# Patient Record
Sex: FEMALE | Race: BLACK OR AFRICAN AMERICAN | Employment: FULL TIME | URBAN - METROPOLITAN AREA
[De-identification: names, ages, dates, MRNs, and addresses within clinical notes are randomized per-mention and may not be internally consistent; named-entity substitution may affect disease eponyms.]

---

## 2018-06-09 ENCOUNTER — HOSPITAL ENCOUNTER (EMERGENCY)
Age: 23
Discharge: HOME | End: 2018-06-09
Attending: EMERGENCY MEDICINE
Payer: COMMERCIAL

## 2018-06-09 VITALS
HEIGHT: 62 IN | HEART RATE: 84 BPM | TEMPERATURE: 98 F | DIASTOLIC BLOOD PRESSURE: 76 MMHG | BODY MASS INDEX: 34.78 KG/M2 | WEIGHT: 189 LBS | OXYGEN SATURATION: 98 % | SYSTOLIC BLOOD PRESSURE: 135 MMHG | RESPIRATION RATE: 17 BRPM

## 2018-06-09 DIAGNOSIS — T07.XXXA: Primary | ICD-10-CM

## 2018-06-09 DIAGNOSIS — Z23 IMMUNIZATION, TETANUS-DIPHTHERIA: ICD-10-CM
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
## 2018-06-09 LAB — HCG UR QL: NEGATIVE

## 2018-06-09 PROCEDURE — 90471 IMMUNIZATION ADMIN: CPT

## 2018-06-09 PROCEDURE — 75810000293 HC SIMP/SUPERF WND  RPR

## 2018-06-09 PROCEDURE — 81025 URINE PREGNANCY TEST: CPT

## 2018-06-09 PROCEDURE — 99283 EMERGENCY DEPT VISIT LOW MDM: CPT

## 2018-06-09 PROCEDURE — 77030031139 HC SUT VCRL2 J&J -A

## 2018-06-09 PROCEDURE — 74011250636 HC RX REV CODE- 250/636: Performed by: PHYSICIAN ASSISTANT

## 2018-06-09 PROCEDURE — 75810000294 HC INTERM/LAYERED WND RPR

## 2018-06-09 PROCEDURE — 77030002986 HC SUT PROL J&J -A

## 2018-06-09 PROCEDURE — 74011000250 HC RX REV CODE- 250: Performed by: PHYSICIAN ASSISTANT

## 2018-06-09 PROCEDURE — 90715 TDAP VACCINE 7 YRS/> IM: CPT | Performed by: PHYSICIAN ASSISTANT

## 2018-06-09 PROCEDURE — 77030031132 HC SUT NYL COVD -A

## 2018-06-09 PROCEDURE — 77030018836 HC SOL IRR NACL ICUM -A

## 2018-06-09 PROCEDURE — 74011000250 HC RX REV CODE- 250: Performed by: EMERGENCY MEDICINE

## 2018-06-09 RX ORDER — HYDROCODONE BITARTRATE AND ACETAMINOPHEN 5; 325 MG/1; MG/1
1 TABLET ORAL
Qty: 6 TAB | Refills: 0 | Status: SHIPPED | OUTPATIENT
Start: 2018-06-09

## 2018-06-09 RX ORDER — LIDOCAINE HYDROCHLORIDE 10 MG/ML
20 INJECTION, SOLUTION EPIDURAL; INFILTRATION; INTRACAUDAL; PERINEURAL ONCE
Status: COMPLETED | OUTPATIENT
Start: 2018-06-09 | End: 2018-06-09

## 2018-06-09 RX ORDER — DOXYCYCLINE 100 MG/1
100 CAPSULE ORAL 2 TIMES DAILY
Qty: 14 CAP | Refills: 0 | Status: SHIPPED | OUTPATIENT
Start: 2018-06-09 | End: 2018-06-16

## 2018-06-09 RX ORDER — BACITRACIN 500 UNIT/G
1 PACKET (EA) TOPICAL
Status: COMPLETED | OUTPATIENT
Start: 2018-06-09 | End: 2018-06-09

## 2018-06-09 RX ORDER — BACITRACIN 500 [USP'U]/G
OINTMENT TOPICAL
Status: DISCONTINUED | OUTPATIENT
Start: 2018-06-09 | End: 2018-06-09 | Stop reason: CLARIF

## 2018-06-09 RX ADMIN — BACITRACIN 1 PACKET: 500 OINTMENT TOPICAL at 20:41

## 2018-06-09 RX ADMIN — LIDOCAINE HYDROCHLORIDE 20 ML: 10 INJECTION, SOLUTION EPIDURAL; INFILTRATION; INTRACAUDAL; PERINEURAL at 20:22

## 2018-06-09 RX ADMIN — TETANUS TOXOID, REDUCED DIPHTHERIA TOXOID AND ACELLULAR PERTUSSIS VACCINE, ADSORBED 0.5 ML: 5; 2.5; 8; 8; 2.5 SUSPENSION INTRAMUSCULAR at 17:48

## 2018-06-09 NOTE — LETTER
[facility] EMERGENCY DEPT 
[facility] [address] [id number] 
[phone] Work/School Note Date: 6/9/2018 To Whom It May concern: 
 
Nolvia Hyltno was seen and treated today in the emergency room by the following provider(s): 
Attending Provider: Nicole Pereira MD 
Physician Assistant: Araseli Davis PA-C. Nolvia Hylton may return to work on 6/12/18. Sincerely, Araseli Davis PA-C

## 2018-06-09 NOTE — ED PROVIDER NOTES
EMERGENCY DEPARTMENT HISTORY AND PHYSICAL EXAM    Date: 6/9/2018  Patient Name: Jose Manuel Hines    History of Presenting Illness     Chief Complaint   Patient presents with    Laceration         History Provided By: Patient    Chief Complaint: Lacerations  Duration: Just prior to arrival  Timing:  Acute  Location: Bilateral lower legs  Quality: Burning  Severity: 7 out of 10  Modifying Factors: None identified. Associated Symptoms: denies any other associated signs or symptoms    Additional History (Context):   5:46 PM  Jose Manuel Hines is a 25 y.o. female with no significant PMHX who presents to the emergency department C/O lacerations to right lower leg after she slipped and fell on the rocks at [facility] just prior to arrival. Denies head strike, LOC, vomiting, any other injuries sustained, and any other sxs or complaints. She admits to ETOH today. LMP 5/29/18. Unsure of last tetanus shot, NKDA. PCP: None    Current Outpatient Prescriptions   Medication Sig Dispense Refill    doxycycline (MONODOX) 100 mg capsule Take 1 Cap by mouth two (2) times a day for 7 days. 14 Cap 0    HYDROcodone-acetaminophen (NORCO) 5-325 mg per tablet Take 1 Tab by mouth every four (4) hours as needed for Pain. Max Daily Amount: 6 Tabs. 6 Tab 0       Past History     Past Medical History:  History reviewed. No pertinent past medical history. Past Surgical History:  History reviewed. No pertinent surgical history. Family History:  History reviewed. No pertinent family history. Social History:  Social History   Substance Use Topics    Smoking status: Current Every Day Smoker    Smokeless tobacco: Never Used    Alcohol use Yes       Allergies:  No Known Allergies      Review of Systems   Review of Systems   Constitutional: Negative for fever. Skin: Positive for wound. Neurological: Negative for syncope, weakness and numbness. All other systems reviewed and are negative.       Physical Exam     Vitals:    06/09/18 1707 06/09/18 2052   BP: 135/76    Pulse: (!) 118 84   Resp: 17    Temp: 98 °F (36.7 °C)    SpO2: 98%    Weight: 85.7 kg (189 lb)    Height: 5' 2\" (1.575 m)      Physical Exam   Constitutional: She appears well-developed and well-nourished. No distress. HENT:   Head: Normocephalic and atraumatic. Right Ear: External ear normal.   Left Ear: External ear normal.   Nose: Nose normal.   Eyes: Conjunctivae are normal.   Neck: Normal range of motion. Cardiovascular: Normal rate, regular rhythm and normal heart sounds. Pulmonary/Chest: Effort normal and breath sounds normal. No respiratory distress. Neurological: She is alert. Skin: Skin is warm and dry. She is not diaphoretic. RLE:   Multiple abrasions over the anteromedial lower extremity. Abrasions are superficial but some are gaping, specifically on the medial aspect of the extremity. Lateral aspect of the foot proximal to the fifth digit there is a 2 cm gaping laceration without evidence of underlying structures. On the plantar aspect of the foot there is a 5mm laceration at the base of the fifth digit. Good ROM of the extremity, sensation is intact, neurovascularly intact, including at the fifth digit with cap refill < 2 seconds. Psychiatric: She has a normal mood and affect. Vitals reviewed.         Diagnostic Study Results     Labs -     Recent Results (from the past 12 hour(s))   HCG URINE, QL. - POC    Collection Time: 06/09/18  5:53 PM   Result Value Ref Range    Pregnancy test,urine (POC) NEGATIVE  NEG         Radiologic Studies -   No orders to display     CT Results  (Last 48 hours)    None        CXR Results  (Last 48 hours)    None          Medications given in the ED-  Medications   diph,Pertuss(AC),Tet Vac-PF (BOOSTRIX) suspension 0.5 mL (0.5 mL IntraMUSCular Given 6/9/18 1748)   lidocaine (PF) (XYLOCAINE) 10 mg/mL (1 %) injection 20 mL (20 mL IntraDERMal Given 6/9/18 2022)   bacitracin 500 unit/gram packet 1 Packet (1 Packet Topical Given 6/9/18 2041)         Medical Decision Making   I am the first provider for this patient. I reviewed the vital signs, available nursing notes, past medical history, past surgical history, family history and social history. Vital Signs-Reviewed the patient's vital signs. Records Reviewed: Nursing Notes    Provider Notes (Medical Decision Making): Patient with multiple lacerations and abrasions to right lower leg after slip and fall today. Repaired in the ED, updated tetanus, will place on doxycycline for infection prophylaxis. Procedures:  Wound Repair  Date/Time: 6/10/2018 2:08 AM  Performed by: PAPreparation: skin prepped with Betadine  Location details: right leg  right foot and right little toe  Anesthesia: local infiltration    Anesthesia:  Local Anesthetic: lidocaine 1% without epinephrine  Foreign bodies: no foreign bodies  Irrigation solution: saline  Irrigation method: syringe  Debridement: minimal  Skin closure: 4-0 nylon and 5-0 nylon  Subcutaneous closure: Vicryl  Number of sutures: 31  Technique: simple  Approximation: close  Dressing: antibiotic ointment and gauze roll  Patient tolerance: Patient tolerated the procedure well with no immediate complications  My total time at bedside, performing this procedure was 46-60 minutes. Comments: Laceration #1 on diagram received three subcutaneous sutures with 5-0 vicryl and six skin sutures with 4-0 nylon. Laceration #2 on diagram received three subcutaneous sutures with 5-0 vicryl and four skin sutures with 4-0 nylon. Four 5-0 prolene sutures to the anteromedial aspect of the extremity. Two 5-0 prolene sutures to the dorsum of the foot. Two 5-0 prolene sutures to the base of the fifth digit. Two 5-0 prolene sutures to the lateral aspect of the fifth digit. All wounds cleaned with normal saline and betadine.            Diagnosis and Disposition       DISCHARGE NOTE:    Cami Orosco's  results have been reviewed with her. She has been counseled regarding her diagnosis, treatment, and plan. She verbally conveys understanding and agreement of the signs, symptoms, diagnosis, treatment and prognosis and additionally agrees to follow up as discussed. She also agrees with the care-plan and conveys that all of her questions have been answered. I have also provided discharge instructions for her that include: educational information regarding their diagnosis and treatment, and list of reasons why they would want to return to the ED prior to their follow-up appointment, should her condition change. She has been provided with education for proper emergency department utilization. CLINICAL IMPRESSION:    1. Laceration of multiple sites    2. Immunization, tetanus-diphtheria    3. Fall, initial encounter        PLAN:  1. D/C Home  2. Discharge Medication List as of 6/9/2018  8:24 PM      START taking these medications    Details   doxycycline (MONODOX) 100 mg capsule Take 1 Cap by mouth two (2) times a day for 7 days. , Print, Disp-14 Cap, R-0      HYDROcodone-acetaminophen (NORCO) 5-325 mg per tablet Take 1 Tab by mouth every four (4) hours as needed for Pain. Max Daily Amount: 6 Tabs., Print, Disp-6 Tab, R-0           3.    Follow-up Information     Follow up With Details Comments [address] Schedule an appointment as soon as possible for a visit for primary care needs [address] [facility] EMERGENCY DEPT  As needed, If symptoms worsen [address]  [phone]

## 2018-06-09 NOTE — Clinical Note
You have 23 sutures that need to be removed in 10-14 days. 1. Antibiotic as prescribed and until finished. 2. Tylenol/Motrin for pain. Norco for severe pain. 3. Return for persistent or worsening symptoms, redness, fevers, drainage.

## 2018-06-20 ENCOUNTER — HOSPITAL ENCOUNTER (EMERGENCY)
Age: 23
Discharge: HOME | End: 2018-06-20
Attending: EMERGENCY MEDICINE
Payer: COMMERCIAL

## 2018-06-20 VITALS
SYSTOLIC BLOOD PRESSURE: 121 MMHG | TEMPERATURE: 98.6 F | RESPIRATION RATE: 16 BRPM | OXYGEN SATURATION: 100 % | DIASTOLIC BLOOD PRESSURE: 84 MMHG | HEART RATE: 88 BPM

## 2018-06-20 DIAGNOSIS — Z48.02 VISIT FOR SUTURE REMOVAL: Primary | ICD-10-CM

## 2018-06-20 PROCEDURE — 75810000275 HC EMERGENCY DEPT VISIT NO LEVEL OF CARE

## 2018-06-21 NOTE — ED PROVIDER NOTES
EMERGENCY DEPARTMENT HISTORY AND PHYSICAL EXAM    Date: 6/20/2018  Patient Name: Adan Callahan    History of Presenting Illness     Chief Complaint   Patient presents with    Suture Removal         History Provided By: Patient    Chief Complaint: suture removal   Duration: 11 days   Timing:  Constant  Location: Right lower leg   Quality: n/a  Severity: Mild  Modifying Factors: None   Associated Symptoms: none       Additional History (Context): Adan Callahan is a 25 y.o. female with no medical history who presents with a 11 day history for suture removal, denies any concerns of signs of infection or worsening condition. Pt denies any fevers or chills, headache, dizziness or light headedness, ENT issues, CP or discomfort, SOB, cough, n/v/d/c, abd pain, back pain, diaphoresis, melena/hematochezia, dysuria, hematuria, frequency, focal weakness/numbness/tingling. Patient has no other complaints at this time. PCP: None    Current Outpatient Prescriptions   Medication Sig Dispense Refill    HYDROcodone-acetaminophen (NORCO) 5-325 mg per tablet Take 1 Tab by mouth every four (4) hours as needed for Pain. Max Daily Amount: 6 Tabs. 6 Tab 0       Past History     Past Medical History:  History reviewed. No pertinent past medical history. Past Surgical History:  History reviewed. No pertinent surgical history. Family History:  History reviewed. No pertinent family history. Social History:  Social History   Substance Use Topics    Smoking status: Current Every Day Smoker    Smokeless tobacco: Never Used    Alcohol use Yes       Allergies:  No Known Allergies      Review of Systems   Review of Systems   Constitutional: Negative for chills and fever. HENT: Negative for congestion, rhinorrhea and sore throat. Respiratory: Negative for cough and shortness of breath. Cardiovascular: Negative for chest pain.    Gastrointestinal: Negative for abdominal pain, blood in stool, constipation, diarrhea, nausea and vomiting. Genitourinary: Negative for dysuria, frequency and hematuria. Musculoskeletal: Negative for back pain and myalgias. Skin: Positive for wound. Negative for rash. Neurological: Negative for dizziness and headaches. All other systems reviewed and are negative. All Other Systems Negative  Physical Exam     Vitals:    06/20/18 2002   BP: 121/84   Pulse: 88   Resp: 16   Temp: 98.6 °F (37 °C)   SpO2: 100%     Physical Exam   Constitutional: She is oriented to person, place, and time. She appears well-developed and well-nourished. No distress. HENT:   Head: Normocephalic and atraumatic. Eyes: Conjunctivae are normal.   Neck: Normal range of motion. Neck supple. Cardiovascular: Normal rate, regular rhythm and normal heart sounds. Pulmonary/Chest: Effort normal and breath sounds normal. No respiratory distress. She exhibits no tenderness. Abdominal: Soft. Bowel sounds are normal. She exhibits no distension. There is no tenderness. There is no rebound and no guarding. Musculoskeletal: She exhibits no edema or deformity. Neurological: She is alert and oriented to person, place, and time. Skin: Skin is warm and dry. She is not diaphoretic. Multiple well healing lacerations noted to the right lower leg, large abrasion without signs of infection noted to medial lower leg    Psychiatric: She has a normal mood and affect. Her behavior is normal.   Nursing note and vitals reviewed. Diagnostic Study Results     Labs -   No results found for this or any previous visit (from the past 12 hour(s)). Radiologic Studies -   No orders to display     CT Results  (Last 48 hours)    None        CXR Results  (Last 48 hours)    None            Medical Decision Making   I am the first provider for this patient. I reviewed the vital signs, available nursing notes, past medical history, past surgical history, family history and social history.     Vital Signs-Reviewed the patient's vital signs.      Pulse Oximetry Analysis -  100 % RA    Records Reviewed: Nursing Notes and Old Medical Records     Procedures: None   Suture/Staple Removal  Date/Time: 6/20/2018 8:29 PM  Performed by: Yana Mann  Authorized by: Alison JIMENEZ     Consent:     Consent obtained:  Verbal    Consent given by:  Patient    Risks discussed:  Bleeding and wound separation    Alternatives discussed:  No treatment and delayed treatment  Location:     Location:  Lower extremity    Lower extremity location:  Leg    Leg location:  R lower leg  Procedure details:     Wound appearance:  No signs of infection, good wound healing and clean    Number of sutures removed:  24  Post-procedure details:     Post-removal:  No dressing applied    Patient tolerance of procedure: Tolerated well, no immediate complications        Provider Notes (Medical Decision Making):     differential diagnosis: suture removal, abscess, cellulitis       Plan: Will remove sutures       MED RECONCILIATION:  No current facility-administered medications for this encounter. Current Outpatient Prescriptions   Medication Sig    HYDROcodone-acetaminophen (NORCO) 5-325 mg per tablet Take 1 Tab by mouth every four (4) hours as needed for Pain. Max Daily Amount: 6 Tabs. Disposition:  Home     DISCHARGE NOTE:   Pt has been reexamined. Patient has no new complaints, changes, or physical findings. Care plan outlined and precautions discussed. Results of workup were reviewed with the patient. All medications were reviewed with the patient. All of pt's questions and concerns were addressed. Patient was instructed and agrees to follow up with PCP as needed, as well as to return to the ED upon further deterioration. Patient is ready to go home. Follow-up Information     Follow up With Details Comments Contact [facility] EMERGENCY DEPT  As needed [address] [place] [address]  [phone]          Diagnosis     Clinical Impression:   1.  Visit for suture removal

## 2018-06-21 NOTE — DISCHARGE INSTRUCTIONS
Learning About Stitches and Staples Removal  When are stitches and staples removed? Your doctor will tell you when to have your stitches or staples removed, usually in 7 to 14 days. How long you'll be told to wait will depend on things like where the wound is located, how big and how deep the wound is, and what your general health is like. Do not remove the stitches on your own. Stitches on the face are usually removed within a week. But stitches and staples on other areas of the body, such as on the back or belly or over a joint, may need to stay in place longer, often a week or two. Be sure to follow your doctor's instructions. How are stitches and staples removed? It usually doesn't hurt when the doctor removes the stitches or staples. You may feel a tug as each stitch or staple is removed. · You will either be seated or lying down. · To remove stitches, the doctor will use scissors to cut each of the knots and then pull the threads out. · To remove staples, the doctor will use a tool to take out the staples one at a time. · The area may still feel tender after the stitches or staples are gone. But it should feel better within a few minutes or up to a few hours. What can you expect after stitches and staples are removed? Depending on the type and location of the cut, you will have a scar. Scars usually fade over time. Keep the area clean, but you won't need a bandage. When should you call for help? Call your doctor now or seek immediate medical care if :  · You have new pain, or your pain gets worse. · You have trouble moving the area near the scar. · You have symptoms of infection, such as:  ¨ Increased pain, swelling, warmth, or redness around the scar. ¨ Red streaks leading from the scar. ¨ Pus draining from the scar. ¨ A fever. Watch closely for changes in your health, and be sure to contact your doctor if:  · The scar opens. · You do not get better as expected.   Follow-up care is a key part of your treatment and safety. Be sure to make and go to all appointments, and call your doctor if you do not get better as expected. It's also a good idea to keep a list of the medicines you take. Where can you learn more? Go to http://dinah-amanda.info/. Enter H803 in the search box to learn more about \"Learning About Stitches and Staples Removal.\"  Current as of: March 20, 2017  Content Version: 11.4  © 6346-3820 [employer]. Care instructions adapted under license by [employer] (which disclaims liability or warranty for this information). If you have questions about a medical condition or this instruction, always ask your healthcare professional. [address] any warranty or liability for your use of this information.